# Patient Record
Sex: FEMALE | Race: BLACK OR AFRICAN AMERICAN | Employment: FULL TIME | URBAN - METROPOLITAN AREA
[De-identification: names, ages, dates, MRNs, and addresses within clinical notes are randomized per-mention and may not be internally consistent; named-entity substitution may affect disease eponyms.]

---

## 2020-11-23 ENCOUNTER — APPOINTMENT (OUTPATIENT)
Dept: GENERAL RADIOLOGY | Age: 30
End: 2020-11-23
Payer: COMMERCIAL

## 2020-11-23 ENCOUNTER — HOSPITAL ENCOUNTER (EMERGENCY)
Age: 30
Discharge: HOME | End: 2020-11-23
Payer: COMMERCIAL

## 2020-11-23 ENCOUNTER — APPOINTMENT (OUTPATIENT)
Dept: ULTRASOUND IMAGING | Age: 30
End: 2020-11-23
Payer: COMMERCIAL

## 2020-11-23 ENCOUNTER — APPOINTMENT (OUTPATIENT)
Dept: CT IMAGING | Age: 30
End: 2020-11-23
Payer: COMMERCIAL

## 2020-11-23 VITALS
WEIGHT: 168.65 LBS | HEART RATE: 69 BPM | BODY MASS INDEX: 26.47 KG/M2 | SYSTOLIC BLOOD PRESSURE: 114 MMHG | OXYGEN SATURATION: 99 % | DIASTOLIC BLOOD PRESSURE: 69 MMHG | TEMPERATURE: 98.6 F | HEIGHT: 67 IN | RESPIRATION RATE: 16 BRPM

## 2020-11-23 LAB
ANION GAP SERPL CALCULATED.3IONS-SCNC: 11 MMOL/L (ref 3–16)
BASOPHILS ABSOLUTE: 0.1 K/UL (ref 0–0.2)
BASOPHILS RELATIVE PERCENT: 0.9 %
BUN BLDV-MCNC: 14 MG/DL (ref 7–20)
CALCIUM SERPL-MCNC: 9.1 MG/DL (ref 8.3–10.6)
CHLORIDE BLD-SCNC: 103 MMOL/L (ref 99–110)
CO2: 22 MMOL/L (ref 21–32)
CREAT SERPL-MCNC: 0.6 MG/DL (ref 0.6–1.1)
EOSINOPHILS ABSOLUTE: 0.3 K/UL (ref 0–0.6)
EOSINOPHILS RELATIVE PERCENT: 5.3 %
GFR AFRICAN AMERICAN: >60
GFR NON-AFRICAN AMERICAN: >60
GLUCOSE BLD-MCNC: 94 MG/DL (ref 70–99)
GONADOTROPIN, CHORIONIC (HCG) QUANT: <5 MIU/ML
HCG QUALITATIVE: NEGATIVE
HCT VFR BLD CALC: 39.1 % (ref 36–48)
HEMOGLOBIN: 12.8 G/DL (ref 12–16)
LYMPHOCYTES ABSOLUTE: 2.6 K/UL (ref 1–5.1)
LYMPHOCYTES RELATIVE PERCENT: 47.1 %
MCH RBC QN AUTO: 28.9 PG (ref 26–34)
MCHC RBC AUTO-ENTMCNC: 32.7 G/DL (ref 31–36)
MCV RBC AUTO: 88.3 FL (ref 80–100)
MONOCYTES ABSOLUTE: 0.4 K/UL (ref 0–1.3)
MONOCYTES RELATIVE PERCENT: 7.2 %
NEUTROPHILS ABSOLUTE: 2.2 K/UL (ref 1.7–7.7)
NEUTROPHILS RELATIVE PERCENT: 39.5 %
PDW BLD-RTO: 14.3 % (ref 12.4–15.4)
PLATELET # BLD: 214 K/UL (ref 135–450)
PMV BLD AUTO: 10 FL (ref 5–10.5)
POTASSIUM REFLEX MAGNESIUM: 3.7 MMOL/L (ref 3.5–5.1)
RBC # BLD: 4.42 M/UL (ref 4–5.2)
SODIUM BLD-SCNC: 136 MMOL/L (ref 136–145)
TROPONIN: <0.01 NG/ML
WBC # BLD: 5.5 K/UL (ref 4–11)

## 2020-11-23 PROCEDURE — 96375 TX/PRO/DX INJ NEW DRUG ADDON: CPT

## 2020-11-23 PROCEDURE — 71046 X-RAY EXAM CHEST 2 VIEWS: CPT

## 2020-11-23 PROCEDURE — 80048 BASIC METABOLIC PNL TOTAL CA: CPT

## 2020-11-23 PROCEDURE — 85025 COMPLETE CBC W/AUTO DIFF WBC: CPT

## 2020-11-23 PROCEDURE — 84484 ASSAY OF TROPONIN QUANT: CPT

## 2020-11-23 PROCEDURE — 84703 CHORIONIC GONADOTROPIN ASSAY: CPT

## 2020-11-23 PROCEDURE — 99283 EMERGENCY DEPT VISIT LOW MDM: CPT

## 2020-11-23 PROCEDURE — 70450 CT HEAD/BRAIN W/O DYE: CPT

## 2020-11-23 PROCEDURE — 96374 THER/PROPH/DIAG INJ IV PUSH: CPT

## 2020-11-23 PROCEDURE — 84702 CHORIONIC GONADOTROPIN TEST: CPT

## 2020-11-23 PROCEDURE — 93005 ELECTROCARDIOGRAM TRACING: CPT | Performed by: EMERGENCY MEDICINE

## 2020-11-23 PROCEDURE — 76830 TRANSVAGINAL US NON-OB: CPT

## 2020-11-23 PROCEDURE — 6360000002 HC RX W HCPCS: Performed by: NURSE PRACTITIONER

## 2020-11-23 RX ORDER — PROCHLORPERAZINE EDISYLATE 5 MG/ML
10 INJECTION INTRAMUSCULAR; INTRAVENOUS ONCE
Status: COMPLETED | OUTPATIENT
Start: 2020-11-23 | End: 2020-11-23

## 2020-11-23 RX ORDER — BUTALBITAL, ASPIRIN, AND CAFFEINE 325; 50; 40 MG/1; MG/1; MG/1
1 CAPSULE ORAL EVERY 4 HOURS PRN
Qty: 20 CAPSULE | Refills: 0 | Status: SHIPPED | OUTPATIENT
Start: 2020-11-23 | End: 2020-11-28

## 2020-11-23 RX ORDER — DIPHENHYDRAMINE HYDROCHLORIDE 50 MG/ML
25 INJECTION INTRAMUSCULAR; INTRAVENOUS ONCE
Status: COMPLETED | OUTPATIENT
Start: 2020-11-23 | End: 2020-11-23

## 2020-11-23 RX ORDER — KETOROLAC TROMETHAMINE 30 MG/ML
30 INJECTION, SOLUTION INTRAMUSCULAR; INTRAVENOUS ONCE
Status: COMPLETED | OUTPATIENT
Start: 2020-11-23 | End: 2020-11-23

## 2020-11-23 RX ADMIN — DIPHENHYDRAMINE HYDROCHLORIDE 25 MG: 50 INJECTION, SOLUTION INTRAMUSCULAR; INTRAVENOUS at 21:53

## 2020-11-23 RX ADMIN — PROCHLORPERAZINE EDISYLATE 10 MG: 5 INJECTION INTRAMUSCULAR; INTRAVENOUS at 21:53

## 2020-11-23 RX ADMIN — KETOROLAC TROMETHAMINE 30 MG: 30 INJECTION, SOLUTION INTRAMUSCULAR at 21:52

## 2020-11-23 ASSESSMENT — ENCOUNTER SYMPTOMS
COLOR CHANGE: 0
ABDOMINAL PAIN: 0
VOMITING: 0
DIARRHEA: 0
BACK PAIN: 0
COUGH: 0
WHEEZING: 0
NAUSEA: 0
SHORTNESS OF BREATH: 0

## 2020-11-23 ASSESSMENT — PAIN SCALES - GENERAL
PAINLEVEL_OUTOF10: 8
PAINLEVEL_OUTOF10: 8

## 2020-11-23 ASSESSMENT — PAIN DESCRIPTION - LOCATION: LOCATION: HEAD

## 2020-11-23 ASSESSMENT — PAIN DESCRIPTION - DESCRIPTORS: DESCRIPTORS: ACHING

## 2020-11-24 LAB
EKG ATRIAL RATE: 66 BPM
EKG DIAGNOSIS: NORMAL
EKG P AXIS: 42 DEGREES
EKG P-R INTERVAL: 150 MS
EKG Q-T INTERVAL: 398 MS
EKG QRS DURATION: 80 MS
EKG QTC CALCULATION (BAZETT): 417 MS
EKG R AXIS: 15 DEGREES
EKG T AXIS: 37 DEGREES
EKG VENTRICULAR RATE: 66 BPM

## 2020-11-24 PROCEDURE — 93010 ELECTROCARDIOGRAM REPORT: CPT | Performed by: INTERNAL MEDICINE

## 2020-11-24 NOTE — ED PROVIDER NOTES
I was available for consultation during patient's ED stay. Patient was cared for by KAREN. I did not evaluate or participate in patient care. The Ekg interpreted by me shows  Normal sinus rhythm with a rate of 66, normal axis, , QRS 80, QTc 417, no ST elevations, no prior EKG on file.     Kristopher Back MD  [facility] – [place], [place]  11/23/20  10:30 PM EST          Kristopher Back MD  11/23/20 6238

## 2020-11-24 NOTE — ED PROVIDER NOTES
worst headache of life. She denies thunderclap presentation. She rates the headache an 8 out of 10. She denies any chest pain pleuritic chest pain or shortness of breath. No cough, congestion, fever or chills. No neck pain or neck stiffness. She denies any additional complaints, no additional symptoms. No additional medications. No additional aggravating or alleviating factors. She states that she did see her OB/GYN last week, is supposed to have an ultrasound scheduled for this Wednesday. She presents awake, alert and in no acute respiratory distress or toxic appearance    PastMedical/Surgical History:  History reviewed. No pertinent past medical history. History reviewed. No pertinent surgical history. Medications:  Discharge Medication List as of 11/23/2020 10:10 PM      CONTINUE these medications which have NOT CHANGED    Details   scopolamine (TRANSDERM-SCOP, 1.5 MG,) transdermal patch Place 1 patch onto the skin every 72 hours, Disp-6 patch, R-0Print               Review of Systems:  Review of Systems   Constitutional: Negative for chills and fever. HENT: Negative for congestion. Respiratory: Negative for cough, shortness of breath and wheezing. Cardiovascular: Negative for chest pain. Patient states her blood pressures been elevated off and on for the past several weeks however, is not having any chest pain pleuritic chest pain or shortness of breath. Gastrointestinal: Negative for abdominal pain, diarrhea, nausea and vomiting. Genitourinary: Positive for vaginal bleeding. Negative for difficulty urinating, dysuria, frequency and hematuria. Patient complains of vaginal bleeding, states that she had a positive pregnancy test last Thursday but she also has an IUD in place. Musculoskeletal: Negative for back pain. Skin: Negative for color change. Neurological: Positive for headaches. Negative for weakness and numbness.         Patient states she has been dealing with intermittent headaches off and on for the past several days to weeks, she denies worst headache of life. No photo or phonophobia. No blurred or loss of vision. Positives and Pertinent negatives as per HPI. Except as noted above in the ROS, problem specific ROS was completed and is negative. Physical Exam:  Physical Exam  Vitals signs and nursing note reviewed. Constitutional:       Appearance: She is well-developed. She is not diaphoretic. HENT:      Head: Normocephalic. Right Ear: External ear normal.      Left Ear: External ear normal.   Eyes:      General: No scleral icterus. Right eye: No discharge. Left eye: No discharge. Pupils: Pupils are equal, round, and reactive to light. Comments: Pupils are 3 mm round and reactive, normal extraocular movement, no visible nystagmus. Neck:      Musculoskeletal: Normal range of motion and neck supple. Cardiovascular:      Rate and Rhythm: Normal rate. Comments: Normal S1 and 2, peripheral pulses are 2+, no edema observed  Pulmonary:      Effort: Pulmonary effort is normal. No respiratory distress. Breath sounds: Normal breath sounds. Abdominal:      General: Bowel sounds are normal.      Palpations: Abdomen is soft. Tenderness: There is no abdominal tenderness. Comments: Abdomen is soft and nondistended. Bowel sounds are positive, no acute tenderness, guarding or rebound tenderness. No acute ascites or rigidity. Musculoskeletal: Normal range of motion. Skin:     General: Skin is warm. Capillary Refill: Capillary refill takes less than 2 seconds. Coloration: Skin is not pale. Neurological:      General: No focal deficit present. Mental Status: She is alert and oriented to person, place, and time. GCS: GCS eye subscore is 4. GCS verbal subscore is 5. GCS motor subscore is 6. Cranial Nerves: Cranial nerves are intact. Sensory: Sensation is intact.       Motor: Motor function is intact. Coordination: Coordination is intact. Comments: Patient is awake, alert following all commands correctly. Neurologically intact no focal deficits. No numbness or tingling or paresthesias. No facial asymmetry. Tongue is midline. NIH of 0. Psychiatric:         Behavior: Behavior normal.         MEDICAL DECISION MAKING    Vitals:    Vitals:    11/23/20 1735 11/23/20 2030 11/23/20 2045   BP: (!) 179/79 136/82 114/69   Pulse: 69     Resp: 16     Temp: 98.6 °F (37 °C)     TempSrc: Oral     SpO2: 99% 99% 99%   Weight: 168 lb 10.4 oz (76.5 kg)     Height: 5' 7\" (1.702 m)         LABS:  Labs Reviewed   HCG, SERUM, QUALITATIVE    Narrative:     Performed at:  [address] [facility] [address]   Phone [phone]   CBC WITH AUTO DIFFERENTIAL    Narrative:     Performed at:  [address] [address]   Phone [phone]   BASIC METABOLIC PANEL W/ REFLEX TO MG FOR LOW K    Narrative:     Performed at:  [address] [facility] [address] [address]   Phone [phone]   TROPONIN    Narrative:     Performed at:  [facility]  [facility] [address]   Phone [phone]   HCG, QUANTITATIVE, PREGNANCY    Narrative:     added on earlier labs  Performed at:  [address] [facility] [address]   Phone [phone] of labs reviewed and werenegative at this time or not returned at the time of this note.     RADIOLOGY:   Non-plain film images such as CT, Ultrasound and MRI are read by the radiologist. Bethany ACEVEDO, APRN - CNP have directly visualized the radiologic plain film image(s) with the below findings:        Interpretation per the Radiologist below, if available at the time of this note:    US NON OB feeling off and on for the past 2 to 3 weeks in addition to elevation her blood pressure when she checks her blood pressure at her mom's house. She is also complaining of vaginal bleeding, believes that she could be having a miscarriage, she states that she has an IUD in place however had a positive pregnancy test twice last Thursday. She denies any abdominal pain or tenderness on exam.  However, the concerning thing is that she has an IUD. After evaluation and examination the patient I ordered IV access, blood work, chest x-ray, EKG, CT scan of the head secondary to her headache and concern of elevation of blood pressure but also a transvaginal ultrasound secondary to pregnancy to rule out ectopic. EKG shows sinus rhythm rate of 66 bpm, please see Dr. Cm Ball EKG interpretation note. CBC shows no acute sepsis or anemia. Pregnancy is negative. Metabolic panel shows no acute electrolyte disturbances or renal insufficiency. Troponin is negative. Quantitative hCG is negative. Transvaginal ultrasound is unremarkable, no visible IUP, the IUD is in place. There unable to visualize the right ovary however she has no abdominal tenderness, guarding or rebound tenderness. CT the head shows no acute intracranial abnormality. Chest x-ray shows no acute cardiopulmonary findings. Upon reevaluation vital signs are stable, patient reports complete resolution of headache and is feeling better. In regards to the patient's headache and blood pressure, I have no concerns for endorgan involvement. I do believe that her headaches are managed on outpatient basis and I will treat her with Fioricet for this. In addition regards to her concern of being pregnant and having a IUD in place, there is no concern for ectopic pregnancy, tubo-ovarian abscess or torsion. I do believe that she possibly had a false positive at home with her test.  I believe that this also can be managed on outpatient basis.     Therefore, shared medical decision was made between the patient and myself and we agreed that she could be discharged with outpatient follow-up. Patient was discharged home with referral back to her PCP and gynecological services. Discharge home with Fioricet to take as needed for headache. I did educate her about tracking her blood pressure to evaluate for concerns of hypertension. However, at this time there is no concern for essential hypertension, hypertension with endorgan involvement. The patient tolerated their visit well. I evaluated the patient. The physician was available for consultation as needed. The patient and / or the family were informed of the results of any tests, a time was given to answer questions, a plan was proposed and they agreed with plan. The patient verbalized understanding of discharge instructions and was discharged from the department in stable condition. CLINICAL IMPRESSION:  1. Generalized headache    2. Intermittent hypertension    3. Dysfunctional uterine bleeding    4. IUD (intrauterine device) in place        DISPOSITION Decision To Discharge 11/23/2020 10:02:56 PM      PATIENT REFERRED TO:  [facility]) Pre-Services  [phone]  Schedule an appointment as soon as possible for a visit in 2 days  Follow-up with your family doctor or this family doctor in the next 2 days for reevaluation      Please call your OB/GYN tomorrow and to discuss your IUD and dysfunctional uterine bleeding        [facility] Emergency Department  [address] [id number] [phone]  Go to   If symptoms worsen      DISCHARGE MEDICATIONS:  Discharge Medication List as of 11/23/2020 10:10 PM      START taking these medications    Details   butalbital-aspirin-caffeine (FIORINAL) -40 MG capsule Take 1 capsule by mouth every 4 hours as needed for Headaches for up to 5 days. , Disp-20 capsule,R-0Print             DISCONTINUED MEDICATIONS:  Discharge Medication List as of

## 2020-11-24 NOTE — ED NOTES
.Pt discharged at this time. Discharge instructions and medications reviewed,  Questions were answered. PT verbalized understanding. Follow up appointments were discussed.          [facility]  11/23/20 2450

## 2021-10-06 ENCOUNTER — HOSPITAL ENCOUNTER (EMERGENCY)
Age: 31
Discharge: HOME | End: 2021-10-06
Attending: EMERGENCY MEDICINE
Payer: MEDICAID

## 2021-10-06 VITALS
HEART RATE: 70 BPM | RESPIRATION RATE: 16 BRPM | OXYGEN SATURATION: 98 % | BODY MASS INDEX: 25.46 KG/M2 | HEIGHT: 68 IN | TEMPERATURE: 98.8 F | SYSTOLIC BLOOD PRESSURE: 136 MMHG | DIASTOLIC BLOOD PRESSURE: 69 MMHG | WEIGHT: 168 LBS

## 2021-10-06 DIAGNOSIS — N93.9 ABNORMAL VAGINAL BLEEDING: ICD-10-CM

## 2021-10-06 DIAGNOSIS — R10.2 ACUTE PELVIC PAIN, FEMALE: Primary | ICD-10-CM

## 2021-10-06 LAB
ANION GAP SERPL CALCULATED.3IONS-SCNC: 10 MMOL/L (ref 3–16)
BASOPHILS ABSOLUTE: 0 K/UL (ref 0–0.2)
BASOPHILS RELATIVE PERCENT: 0.8 %
BILIRUBIN URINE: NEGATIVE
BLOOD, URINE: ABNORMAL
BUN BLDV-MCNC: 13 MG/DL (ref 7–20)
CALCIUM SERPL-MCNC: 9.2 MG/DL (ref 8.3–10.6)
CHLORIDE BLD-SCNC: 105 MMOL/L (ref 99–110)
CLARITY: ABNORMAL
CO2: 21 MMOL/L (ref 21–32)
COLOR: ABNORMAL
COMMENT UA: ABNORMAL
CREAT SERPL-MCNC: 0.5 MG/DL (ref 0.6–1.1)
EOSINOPHILS ABSOLUTE: 0.3 K/UL (ref 0–0.6)
EOSINOPHILS RELATIVE PERCENT: 5.8 %
GFR AFRICAN AMERICAN: >60
GFR NON-AFRICAN AMERICAN: >60
GLUCOSE BLD-MCNC: 101 MG/DL (ref 70–99)
GLUCOSE URINE: NEGATIVE MG/DL
HCG QUALITATIVE: NEGATIVE
HCT VFR BLD CALC: 37.1 % (ref 36–48)
HEMOGLOBIN: 12.6 G/DL (ref 12–16)
HYALINE CASTS: ABNORMAL /LPF (ref 0–2)
KETONES, URINE: NEGATIVE MG/DL
LEUKOCYTE ESTERASE, URINE: ABNORMAL
LYMPHOCYTES ABSOLUTE: 2.2 K/UL (ref 1–5.1)
LYMPHOCYTES RELATIVE PERCENT: 40.1 %
MCH RBC QN AUTO: 30 PG (ref 26–34)
MCHC RBC AUTO-ENTMCNC: 33.8 G/DL (ref 31–36)
MCV RBC AUTO: 88.6 FL (ref 80–100)
MICROSCOPIC EXAMINATION: YES
MONOCYTES ABSOLUTE: 0.4 K/UL (ref 0–1.3)
MONOCYTES RELATIVE PERCENT: 6.3 %
NEUTROPHILS ABSOLUTE: 2.6 K/UL (ref 1.7–7.7)
NEUTROPHILS RELATIVE PERCENT: 47 %
NITRITE, URINE: NEGATIVE
PDW BLD-RTO: 14.4 % (ref 12.4–15.4)
PH UA: 6 (ref 5–8)
PLATELET # BLD: 228 K/UL (ref 135–450)
PMV BLD AUTO: 9.4 FL (ref 5–10.5)
POTASSIUM REFLEX MAGNESIUM: 3.9 MMOL/L (ref 3.5–5.1)
PROTEIN UA: >=300 MG/DL
RBC # BLD: 4.19 M/UL (ref 4–5.2)
RBC UA: >100 /HPF (ref 0–4)
SODIUM BLD-SCNC: 136 MMOL/L (ref 136–145)
SPECIFIC GRAVITY UA: 1.02 (ref 1–1.03)
URINE REFLEX TO CULTURE: ABNORMAL
URINE TYPE: ABNORMAL
UROBILINOGEN, URINE: 0.2 E.U./DL
WBC # BLD: 5.5 K/UL (ref 4–11)
WBC UA: ABNORMAL /HPF (ref 0–5)

## 2021-10-06 PROCEDURE — 85025 COMPLETE CBC W/AUTO DIFF WBC: CPT

## 2021-10-06 PROCEDURE — 80048 BASIC METABOLIC PNL TOTAL CA: CPT

## 2021-10-06 PROCEDURE — 36415 COLL VENOUS BLD VENIPUNCTURE: CPT

## 2021-10-06 PROCEDURE — 81001 URINALYSIS AUTO W/SCOPE: CPT

## 2021-10-06 PROCEDURE — 99283 EMERGENCY DEPT VISIT LOW MDM: CPT

## 2021-10-06 PROCEDURE — 84703 CHORIONIC GONADOTROPIN ASSAY: CPT

## 2021-10-06 RX ORDER — NAPROXEN 500 MG/1
500 TABLET ORAL 2 TIMES DAILY WITH MEALS
Qty: 20 TABLET | Refills: 0 | Status: SHIPPED | OUTPATIENT
Start: 2021-10-06 | End: 2021-10-16

## 2021-10-06 NOTE — ED PROVIDER NOTES
EMERGENCY DEPARTMENT PROVIDER NOTE    Patient Identification  Pt Name: Ngozi Allen  MRN: [id number]  [place] 1990  Date of evaluation: 10/6/2021  Provider: Silva Britt DO  PCP: Rama Fortune    Chief Complaint  Vaginal Bleeding (pt states that she had vaginal bleeding that started a few hours ago, monday she states period stopped and then began again today. pt states that she has filled up 3 pads & 3 tampons in the past hour)      HPI  (History provided by patient)  This is a 32 y.o. female otherwise healthy who was brought in by self for vaginal bleeding which began about 3 hours prior to arrival.  Patient states 2 days ago she completed her menstrual period, however the bleeding started again today. Patient reports saturating 3 pads. Associated with crampy lower pelvic pain. Nothing seems to make the symptoms any better or worse. Denies history of irregular menstrual periods in the past.  She denies any fevers, chills, lightheadedness, dizziness or dysuria. Had Mirena placed 3 years ago. .     ROS    Const:  No fevers, no chills, no generalized weakness  Skin:  No rash, no lesions  Eyes:  No visual changes, no blurry or double vision, no pain  ENT:  No sore throat, no difficulty swallowing, no ear pain, no sinus pain or congestion  Card:  No chest pain, no palpitations, no edema  Resp:  No shortness of breath, no cough, no wheezing  Abd:  +abdominal pain, no nausea, no vomiting, no diarrhea  Genitourinary:  No dysuria, no vaginal discharge, +vaginal bleeding  MSK:  No joint pain, no myalgia  Neuro:  No focal weakness, no headache, no paresthesia    All other systems reviewed and negative unless otherwise noted in HPI      I have reviewed the following nursing documentation:  Allergies: Amoxicillin and Pcn [penicillins]    Past medical history: History reviewed. No pertinent past medical history.   Past surgical history:   Past Surgical History:   Procedure Laterality Date    HAND SURGERY  2017       Home medications:   Discharge Medication List as of 10/6/2021  6:22 AM      CONTINUE these medications which have NOT CHANGED    Details   butalbital-aspirin-caffeine (FIORINAL) -40 MG capsule Take 1 capsule by mouth every 4 hours as needed for Headaches for up to 5 days. , Disp-20 capsule,R-0Print      scopolamine (TRANSDERM-SCOP, 1.5 MG,) transdermal patch Place 1 patch onto the skin every 72 hours, Disp-6 patch, R-0Print             Social history:  reports that she has never smoked. She has never used smokeless tobacco. She reports current alcohol use. She reports that she does not use drugs. Family history:  History reviewed. No pertinent family history. Exam  ED Triage Vitals   BP Temp Temp Source Pulse Resp SpO2 Height Weight   10/06/21 0427 10/06/21 0430 10/06/21 0427 10/06/21 0427 10/06/21 0427 10/06/21 0427 10/06/21 0427 10/06/21 0427   136/69 98.8 °F (37.1 °C) Oral 70 16 98 % 5' 8\" (1.727 m) 168 lb (76.2 kg)     Nursing note and vitals reviewed. Constitutional: Well developed, well nourished. Non-toxic in appearance. HENT:      Head: Normocephalic and atraumatic. Ears: External ears normal.      Nose: Nose normal.     Mouth: Membrane mucosa moist and pink. Eyes: Anicteric sclera. No discharge. Neck: Supple. Trachea midline. Cardiovascular: RRR; no murmurs, rubs, or gallops. Pulmonary/Chest: Effort normal. No respiratory distress. CTAB. No stridor. No wheezes. No rales. Abdominal: Soft. No distension. Nontender to deep palpation all quadrants  : patient declined  Musculoskeletal: Moves all extremities. No gross deformity. Neurological: Alert and oriented. Face symmetric. Speech is clear. Skin: Warm and dry. No rash. Psychiatric: Normal mood and affect.  Behavior is normal.        Radiology  No orders to display       Labs  Results for orders placed or performed during the hospital encounter of 10/06/21   CBC Auto Differential   Result Value Ref Range WBC 5.5 4.0 - 11.0 K/uL    RBC 4.19 4.00 - 5.20 M/uL    Hemoglobin 12.6 12.0 - 16.0 g/dL    Hematocrit 37.1 36.0 - 48.0 %    MCV 88.6 80.0 - 100.0 fL    MCH 30.0 26.0 - 34.0 pg    MCHC 33.8 31.0 - 36.0 g/dL    RDW 14.4 12.4 - 15.4 %    Platelets 447 429 - 258 K/uL    MPV 9.4 5.0 - 10.5 fL    Neutrophils % 47.0 %    Lymphocytes % 40.1 %    Monocytes % 6.3 %    Eosinophils % 5.8 %    Basophils % 0.8 %    Neutrophils Absolute 2.6 1.7 - 7.7 K/uL    Lymphocytes Absolute 2.2 1.0 - 5.1 K/uL    Monocytes Absolute 0.4 0.0 - 1.3 K/uL    Eosinophils Absolute 0.3 0.0 - 0.6 K/uL    Basophils Absolute 0.0 0.0 - 0.2 K/uL   Basic Metabolic Panel w/ Reflex to MG   Result Value Ref Range    Sodium 136 136 - 145 mmol/L    Potassium reflex Magnesium 3.9 3.5 - 5.1 mmol/L    Chloride 105 99 - 110 mmol/L    CO2 21 21 - 32 mmol/L    Anion Gap 10 3 - 16    Glucose 101 (H) 70 - 99 mg/dL    BUN 13 7 - 20 mg/dL    CREATININE 0.5 (L) 0.6 - 1.1 mg/dL    GFR Non-African American >60 >60    GFR African American >60 >60    Calcium 9.2 8.3 - 10.6 mg/dL   Urinalysis Reflex to Culture    Specimen: Urine, clean catch   Result Value Ref Range    Color, UA RED (A) Straw/Yellow    Clarity, UA TURBID (A) Clear    Glucose, Ur Negative Negative mg/dL    Bilirubin Urine Negative Negative    Ketones, Urine Negative Negative mg/dL    Specific Gravity, UA 1.025 1.005 - 1.030    Blood, Urine LARGE (A) Negative    pH, UA 6.0 5.0 - 8.0    Protein, UA >=300 (A) Negative mg/dL    Urobilinogen, Urine 0.2 <2.0 E.U./dL    Nitrite, Urine Negative Negative    Leukocyte Esterase, Urine LARGE (A) Negative    Microscopic Examination YES     Urine Type Voided     Urine Reflex to Culture Not Indicated    HCG Qualitative, Serum   Result Value Ref Range    hCG Qual Negative Detects HCG level >10 MIU/mL   Microscopic Urinalysis   Result Value Ref Range    Hyaline Casts, UA 0-2 0 - 2 /LPF    WBC, UA see below (A) 0 - 5 /HPF    RBC, UA >100 (A) 0 - 4 /HPF    Urinalysis Comments see R-0Print             Discontinued Medications:  Discharge Medication List as of 10/6/2021  6:22 AM          This chart was generated using the [address] [place] [address] dictation system. I created this record but it may contain dictation errors given the limitations of this technology.     Tani Matute DO (electronically signed)  Attending Emergency Physician       Tani Matute DO  10/06/21 3794

## 2022-11-28 ENCOUNTER — APPOINTMENT (OUTPATIENT)
Dept: GENERAL RADIOLOGY | Age: 32
End: 2022-11-28
Payer: COMMERCIAL

## 2022-11-28 ENCOUNTER — HOSPITAL ENCOUNTER (EMERGENCY)
Age: 32
Discharge: HOME | End: 2022-11-28
Attending: EMERGENCY MEDICINE
Payer: COMMERCIAL

## 2022-11-28 VITALS
HEART RATE: 82 BPM | RESPIRATION RATE: 16 BRPM | TEMPERATURE: 98.7 F | OXYGEN SATURATION: 97 % | HEIGHT: 68 IN | DIASTOLIC BLOOD PRESSURE: 74 MMHG | WEIGHT: 182.32 LBS | SYSTOLIC BLOOD PRESSURE: 138 MMHG | BODY MASS INDEX: 27.63 KG/M2

## 2022-11-28 DIAGNOSIS — M25.511 ACUTE PAIN OF RIGHT SHOULDER: ICD-10-CM

## 2022-11-28 DIAGNOSIS — R51.9 ACUTE NONINTRACTABLE HEADACHE, UNSPECIFIED HEADACHE TYPE: Primary | ICD-10-CM

## 2022-11-28 PROCEDURE — 99284 EMERGENCY DEPT VISIT MOD MDM: CPT

## 2022-11-28 PROCEDURE — 6370000000 HC RX 637 (ALT 250 FOR IP): Performed by: EMERGENCY MEDICINE

## 2022-11-28 PROCEDURE — 73030 X-RAY EXAM OF SHOULDER: CPT

## 2022-11-28 PROCEDURE — 6360000002 HC RX W HCPCS: Performed by: EMERGENCY MEDICINE

## 2022-11-28 PROCEDURE — 96372 THER/PROPH/DIAG INJ SC/IM: CPT

## 2022-11-28 RX ORDER — LIDOCAINE 4 G/G
1 PATCH TOPICAL ONCE
Status: DISCONTINUED | OUTPATIENT
Start: 2022-11-28 | End: 2022-11-28 | Stop reason: HOSPADM

## 2022-11-28 RX ORDER — LIDOCAINE 50 MG/G
1 PATCH TOPICAL DAILY
Qty: 30 PATCH | Refills: 0 | Status: SHIPPED | OUTPATIENT
Start: 2022-11-28 | End: 2022-12-28

## 2022-11-28 RX ORDER — ONDANSETRON 4 MG/1
8 TABLET, ORALLY DISINTEGRATING ORAL ONCE
Status: DISCONTINUED | OUTPATIENT
Start: 2022-11-28 | End: 2022-11-28 | Stop reason: HOSPADM

## 2022-11-28 RX ORDER — ACETAMINOPHEN 325 MG/1
650 TABLET ORAL EVERY 4 HOURS PRN
Qty: 60 TABLET | Refills: 1 | Status: SHIPPED | OUTPATIENT
Start: 2022-11-28

## 2022-11-28 RX ORDER — IBUPROFEN 400 MG/1
400 TABLET ORAL EVERY 4 HOURS PRN
Qty: 60 TABLET | Refills: 1 | Status: SHIPPED | OUTPATIENT
Start: 2022-11-28

## 2022-11-28 RX ORDER — ACETAMINOPHEN 325 MG/1
650 TABLET ORAL ONCE
Status: COMPLETED | OUTPATIENT
Start: 2022-11-28 | End: 2022-11-28

## 2022-11-28 RX ORDER — KETOROLAC TROMETHAMINE 30 MG/ML
15 INJECTION, SOLUTION INTRAMUSCULAR; INTRAVENOUS ONCE
Status: COMPLETED | OUTPATIENT
Start: 2022-11-28 | End: 2022-11-28

## 2022-11-28 RX ADMIN — ACETAMINOPHEN 650 MG: 325 TABLET ORAL at 17:01

## 2022-11-28 RX ADMIN — KETOROLAC TROMETHAMINE 15 MG: 30 INJECTION, SOLUTION INTRAMUSCULAR; INTRAVENOUS at 17:02

## 2022-11-28 ASSESSMENT — PAIN - FUNCTIONAL ASSESSMENT
PAIN_FUNCTIONAL_ASSESSMENT: NONE - DENIES PAIN
PAIN_FUNCTIONAL_ASSESSMENT: 0-10

## 2022-11-28 ASSESSMENT — PAIN SCALES - GENERAL: PAINLEVEL_OUTOF10: 7

## 2022-11-28 ASSESSMENT — PAIN DESCRIPTION - LOCATION: LOCATION: HEAD

## 2022-11-28 ASSESSMENT — PAIN DESCRIPTION - DESCRIPTORS: DESCRIPTORS: ACHING

## 2022-11-28 NOTE — ED PROVIDER NOTES
[address] [facility]      Pt Name: La Manzano  MRN: [id number]  [place] 1990  Date of evaluation: 11/28/2022  Provider: Princess Lobato MD    CHIEF COMPLAINT     I was sitting at home on the couch last night when the ceiling fell down on me  HISTORY OF PRESENT ILLNESS  (Location/Symptom, Timing/Onset,Context/Setting, Quality, Duration, Modifying Factors, Severity). Note limiting factors. Chief Complaint   Patient presents with    Headache     Pt ceiling fall on here early this morning       La Manzano is a 28 y.o. female who presents to the emergency department secondary to concern for pain after the ceiling fell on her early this morning (approximately 15 hours ago). She states they do not know how it happened. She called her landlord who is investigating. She reports that the ceiling that fell on her fell mostly on the side and into her shoulder. She states that her mom convinced her to come get checked out today because she was having a headache and pain in her shoulder. She is right-handed at baseline. She states that she did not lose consciousness, she was able to get up right away, she is not on any blood thinners. No nausea or vomiting today. The other concern with the ceiling falling down was there was a lot of dust which made it hard to breathe. EMS was called at the time that this happened, she states that she did not want to come in then. Has not taken any medications for her symptoms today. Past medical history noted below. Denies smoking. Aside from what is stated above denies any other symptoms or modifying factors. Nursing Notes reviewed. REVIEW OF SYSTEMS  (2-9 systems for level 4, 10 or more for level 5)   Review of Systems Pertinent positive and negative findings as documented in the HPI; otherwise all other systems were reviewed and were negative. PAST MEDICAL HISTORY   History reviewed.  No pertinent past medical history. SURGICALHISTORY       Past Surgical History:   Procedure Laterality Date    HAND SURGERY  2017     CURRENT MEDICATIONS       Previous Medications    BUTALBITAL-ASPIRIN-CAFFEINE (FIORINAL) -40 MG CAPSULE    Take 1 capsule by mouth every 4 hours as needed for Headaches for up to 5 days. NAPROXEN (NAPROSYN) 500 MG TABLET    Take 1 tablet by mouth 2 times daily (with meals) for 10 days    SCOPOLAMINE (TRANSDERM-SCOP, 1.5 MG,) TRANSDERMAL PATCH    Place 1 patch onto the skin every 72 hours      ALLERGIES     Amoxicillin and Pcn [penicillins]  FAMILY HISTORY     History reviewed. No pertinent family history. SOCIAL HISTORY       Social History     Socioeconomic History    Marital status: Single     Spouse name: None    Number of children: None    Years of education: None    Highest education level: None   Tobacco Use    Smoking status: Never    Smokeless tobacco: Never   Substance and Sexual Activity    Alcohol use: Yes     Comment: occ    Drug use: Never    Sexual activity: Yes     Partners: Male     SCREENINGS    Olga Coma Scale  Eye Opening: Spontaneous  Best Verbal Response: Oriented  Best Motor Response: Obeys commands  Olga Coma Scale Score: 15    PHYSICAL EXAM  (up to 7 for level 4, 8 or more for level 5)   INITIAL VITALS: BP: (!) 145/89, Temp: 98.7 °F (37.1 °C), Heart Rate: 86, Resp: 17, SpO2: 95 %   Physical Exam  Vitals and nursing note reviewed. Constitutional:       General: She is not in acute distress. Appearance: She is not ill-appearing, toxic-appearing or diaphoretic. HENT:      Head: Normocephalic and atraumatic. Right Ear: External ear normal.      Left Ear: External ear normal.      Nose: Nose normal.   Eyes:      General: No scleral icterus. Right eye: No discharge. Left eye: No discharge. Conjunctiva/sclera: Conjunctivae normal.   Neck:      Trachea: No tracheal deviation. Cardiovascular:      Rate and Rhythm: Normal rate. Pulses: Normal pulses. Pulmonary:      Effort: Pulmonary effort is normal. No respiratory distress. Musculoskeletal:         General: Tenderness present. Right shoulder: Tenderness present. No deformity. Normal range of motion. Normal strength. Normal pulse. Left shoulder: Normal range of motion. Right upper arm: No tenderness or bony tenderness. Left upper arm: Normal.      Right elbow: Normal.      Right forearm: Normal.      Right wrist: Normal.      Right hand: Normal.      Cervical back: No rigidity or tenderness. Right lower leg: No edema. Left lower leg: No edema. Skin:     General: Skin is dry. Capillary Refill: Capillary refill takes less than 2 seconds. Neurological:      General: No focal deficit present. Mental Status: She is alert and oriented to person, place, and time. Sensory: No sensory deficit. Motor: No weakness. Psychiatric:         Mood and Affect: Mood normal.         Behavior: Behavior normal.     DIAGNOSTIC RESULTS     RADIOLOGY:   Interpretation per Radiologist below, if available at the time of this note:  XR SHOULDER RIGHT (MIN 2 VIEWS)   Final Result      No acute findings in the right shoulder.            LABS:  Labs Reviewed - No data to display    EMERGENCY DEPARTMENT COURSE and DIFFERENTIAL DIAGNOSIS/MDM:   Patient was given the following medications:  Orders Placed This Encounter   Medications    ketorolac (TORADOL) injection 15 mg    ondansetron (ZOFRAN-ODT) disintegrating tablet 8 mg    acetaminophen (TYLENOL) tablet 650 mg    lidocaine 4 % external patch 1 patch    ibuprofen (ADVIL;MOTRIN) 400 MG tablet     Sig: Take 1 tablet by mouth every 4 hours as needed for Pain or Fever     Dispense:  60 tablet     Refill:  1    acetaminophen (TYLENOL) 325 MG tablet     Sig: Take 2 tablets by mouth every 4 hours as needed for Pain or Fever     Dispense:  60 tablet     Refill:  1    lidocaine (LIDODERM) 5 %     Sig: Place 1 patch onto the skin daily 12 hours on, 12 hours off. Dispense:  30 patch     Refill:  0     CONSULTS:  None    INITIAL VITALS: BP: (!) 145/89, Temp: 98.7 °F (37.1 °C), Heart Rate: 86, Resp: 17, SpO2: 95 %   RECENT VITALS:  BP: 136/82,Temp: 98.7 °F (37.1 °C), Heart Rate: 82, Resp: 16, SpO2: 96 %     Is this patient to be included in the SEP-1 Core Measure due to severe sepsis or septic shock? No   Exclusion criteria - the patient is NOT to be included for SEP-1 Core Measure due to:  2+ SIRS criteria are not met    Annie Parson is a 28 y.o. female who presents to the emergency department secondary to concern for symptoms as noted in HPI. On arrival she is awake, alert, oriented. Her vitals are hemodynamically stable. Her physical exam is largely reassuring, she retains good range of motion of both shoulders, no signs of dislocation or fracture. She does have mild tenderness to palpation along the musculoskeletal area. She is neurovascularly intact distally. Per [place] criteria she does not meet criteria for CT scan of the head or cervical spine. Discussed with her medication for her symptoms and x-ray of her shoulder which she was in agreement with. On reassessment she reported she was feeling better after the medication. She had no new symptoms at that time and repeat vitals remained hemodynamically stable and were improved from initial triage. We discussed the results of her x-ray imaging. Discussed retching exercises and potential for concussion given she had a blow to the head. Discussed follow-up with primary care and return precautions. She expressed understanding of all instructions, was in agreement with plan, and was discharged home in stable condition. FINAL IMPRESSION      1. Acute nonintractable headache, unspecified headache type    2.  Acute pain of right shoulder        DISPOSITION/PLAN   DISPOSITION Decision To Discharge 11/28/2022 07:03:35 PM      PATIENT REFERRED TO:  [facility]    Call in 1 week  For follow up appointment    DISCHARGE MEDICATIONS:  New Prescriptions    ACETAMINOPHEN (TYLENOL) 325 MG TABLET    Take 2 tablets by mouth every 4 hours as needed for Pain or Fever    IBUPROFEN (ADVIL;MOTRIN) 400 MG TABLET    Take 1 tablet by mouth every 4 hours as needed for Pain or Fever    LIDOCAINE (LIDODERM) 5 %    Place 1 patch onto the skin daily 12 hours on, 12 hours off.             (Please note that portions of this note were completed with a voice recognition program. Efforts were made to edit the dictations but occasionally words are mis-transcribed.)    Jennie Treviño MD (electronically signed)  Attending Emergency Physician     Jennie Treviño MD  11/28/22 9005

## 2022-11-28 NOTE — Clinical Note
Kinza Paul was seen and treated in our emergency department on 11/28/2022. She may return to work on 12/05/2022. If you have any questions or concerns, please don't hesitate to call.       Siobhan Warner MD

## 2022-11-28 NOTE — Clinical Note
Charlene Yadav was seen and treated in our emergency department on 11/28/2022. She may return to work on 12/05/2022. If you have any questions or concerns, please don't hesitate to call.       Paige Flynn MD

## 2022-11-29 NOTE — DISCHARGE INSTRUCTIONS
Your x-ray today showed no signs of any fracture or dislocation. We have prescribed you pain medication. At home he can also use heat and ice therapy, make sure there is something between your skin and the heat/ice to protect it (for example like a towel). Please follow-up with your primary care in a week. We included information in your paperwork about shoulder pain, shoulder stretches, and concussion. If you do have a concussion is very mild so your symptoms should not last more than a couple of weeks and may improve as quickly as a few days. Return to emergency department for any new or worsening symptoms or concerns.

## 2023-12-25 ENCOUNTER — HOSPITAL ENCOUNTER (EMERGENCY)
Age: 33
Discharge: HOME | End: 2023-12-26
Attending: EMERGENCY MEDICINE
Payer: COMMERCIAL

## 2023-12-25 VITALS
SYSTOLIC BLOOD PRESSURE: 138 MMHG | WEIGHT: 191.6 LBS | BODY MASS INDEX: 29.04 KG/M2 | TEMPERATURE: 98.2 F | DIASTOLIC BLOOD PRESSURE: 93 MMHG | OXYGEN SATURATION: 99 % | HEIGHT: 68 IN | HEART RATE: 72 BPM | RESPIRATION RATE: 16 BRPM

## 2023-12-25 DIAGNOSIS — N64.4 BREAST PAIN: Primary | ICD-10-CM

## 2023-12-25 PROCEDURE — 99283 EMERGENCY DEPT VISIT LOW MDM: CPT

## 2023-12-26 ENCOUNTER — APPOINTMENT (OUTPATIENT)
Dept: GENERAL RADIOLOGY | Age: 33
End: 2023-12-26
Payer: COMMERCIAL

## 2023-12-26 PROCEDURE — 71046 X-RAY EXAM CHEST 2 VIEWS: CPT

## 2023-12-26 NOTE — DISCHARGE INSTRUCTIONS
You can take ibuprofen and/or tylenol as needed for pain. Apply warm compresses to the area several times a day. You need to follow up with gynecology in 1-2 weeks for re-evaluation. If the tender lump persists you may need a mammogram to further evaluation.

## 2024-06-08 ENCOUNTER — OFFICE VISIT (OUTPATIENT)
Age: 34
End: 2024-06-08

## 2024-06-08 VITALS
BODY MASS INDEX: 29.04 KG/M2 | WEIGHT: 191.6 LBS | HEART RATE: 87 BPM | SYSTOLIC BLOOD PRESSURE: 125 MMHG | HEIGHT: 68 IN | TEMPERATURE: 98.5 F | DIASTOLIC BLOOD PRESSURE: 79 MMHG | OXYGEN SATURATION: 97 %

## 2024-06-08 DIAGNOSIS — N89.8 VAGINAL IRRITATION: ICD-10-CM

## 2024-06-08 DIAGNOSIS — B37.31 CANDIDIASIS OF VULVA AND VAGINA: Primary | ICD-10-CM

## 2024-06-08 LAB
CONTROL: NORMAL
PREGNANCY TEST URINE, POC: NEGATIVE

## 2024-06-08 RX ORDER — FLUCONAZOLE 150 MG/1
150 TABLET ORAL ONCE
Qty: 1 TABLET | Refills: 0 | Status: SHIPPED | OUTPATIENT
Start: 2024-06-08 | End: 2024-06-08

## 2024-06-08 ASSESSMENT — ENCOUNTER SYMPTOMS
NAUSEA: 0
COUGH: 0
VOMITING: 0
DIARRHEA: 0
ABDOMINAL PAIN: 0
BACK PAIN: 0

## 2024-06-08 NOTE — PROGRESS NOTES
Krista Quarles (:  1990) is a 34 y.o. female,New patient, here for evaluation of the following chief complaint(s):  Yeast Infection (PT. C/O VAGINAL IRRITATION, VAGINAL ITCHING X 4 DAYS, ALSO STATES LATE ON PERIOD X 1 WEEK. )      Assessment & Plan :   Diagnosis Orders   1. Candidiasis of vulva and vagina  fluconazole (DIFLUCAN) 150 MG tablet      2. Vaginal irritation  C.trachomatis N.gonorrhoeae DNA, Urine ([facility] Only)    VAGINAL PATHOGENS PROBE *A    POCT urine pregnancy        Results for POC orders placed in visit on 24   POCT urine pregnancy   Result Value Ref Range    Preg Test, Ur negative     Control          Differential diagnoses: UTI, pyelonephritis, chlamydia, gonorrhea, trichomonas, bacterial vaginosis, yeast infection   Plan: Pregnancy test negative today. Advised to repeat pregnancy test in a week if menstrual cycle has not started by then. Will be prescribed diflucan based on symptoms and concerns for a yeast infection. Testing for chlamydia, gonorrhea, trichomonas, bacterial vaginosis, and yeast infection are pending. Advised to abstain from intercourse until labs have resulted. Follow-up with PCP as needed. Return precautions discussed.    Follow up in 3 days if symptoms persist or if symptoms worsen.       Subjective :  HPI  Krista Quarles is a 34 y.o. female who presents with complaints of vaginal irritation. She states that she has noted vaginal irritation for the last two days. She denies noting any vaginal discharge. She denies any urinary symptoms including burning with urination or blood in her urine. She denies any concerns for STIs but would like to be tested today. She denies any fevers, abdominal pain, nausea, vomiting, or diarrhea. She reports that she has had yeast infections in the past and her symptoms are very similar. She does note that her menstrual cycle is almost a week late and is concerned about pregnancy.        Vitals:    24 1539   BP: 125/79   Site:

## 2024-06-08 NOTE — PATIENT INSTRUCTIONS
New Prescriptions    FLUCONAZOLE (DIFLUCAN) 150 MG TABLET    Take 1 tablet by mouth once for 1 dose     Take medication as prescribed for yeast infection.  Abstain from intercourse until labs have resulted.  Encourage rest and increase fluid intake.  Follow-up with your PCP as needed.  Return for severe/worsening symptoms.

## 2024-06-09 LAB
CANDIDA DNA VAG QL NAA+PROBE: ABNORMAL
G VAGINALIS DNA SPEC QL NAA+PROBE: ABNORMAL
T VAGINALIS DNA VAG QL NAA+PROBE: ABNORMAL

## 2024-06-11 LAB
C TRACH DNA UR QL NAA+PROBE: NEGATIVE
N GONORRHOEA DNA UR QL NAA+PROBE: NEGATIVE

## 2024-08-30 ENCOUNTER — HOSPITAL ENCOUNTER (EMERGENCY)
Age: 34
Discharge: HOME | End: 2024-08-30